# Patient Record
Sex: FEMALE | Employment: UNEMPLOYED | URBAN - METROPOLITAN AREA
[De-identification: names, ages, dates, MRNs, and addresses within clinical notes are randomized per-mention and may not be internally consistent; named-entity substitution may affect disease eponyms.]

---

## 2019-04-23 ENCOUNTER — OFFICE VISIT (OUTPATIENT)
Dept: FAMILY MEDICINE CLINIC | Age: 29
End: 2019-04-23

## 2019-04-23 VITALS
BODY MASS INDEX: 25.52 KG/M2 | RESPIRATION RATE: 16 BRPM | SYSTOLIC BLOOD PRESSURE: 120 MMHG | DIASTOLIC BLOOD PRESSURE: 84 MMHG | WEIGHT: 144 LBS | HEIGHT: 63 IN | HEART RATE: 117 BPM | TEMPERATURE: 97.9 F | OXYGEN SATURATION: 99 %

## 2019-04-23 DIAGNOSIS — E53.8 VITAMIN B12 DEFICIENCY: ICD-10-CM

## 2019-04-23 DIAGNOSIS — B35.8 TINEA FACIALE: ICD-10-CM

## 2019-04-23 DIAGNOSIS — J34.2 NASAL SEPTAL DEVIATION: ICD-10-CM

## 2019-04-23 DIAGNOSIS — E55.9 VITAMIN D DEFICIENCY: ICD-10-CM

## 2019-04-23 DIAGNOSIS — Z00.00 ROUTINE GENERAL MEDICAL EXAMINATION AT A HEALTH CARE FACILITY: Primary | ICD-10-CM

## 2019-04-23 DIAGNOSIS — E66.3 OVER WEIGHT: ICD-10-CM

## 2019-04-23 DIAGNOSIS — R07.89 CHEST TIGHTNESS: ICD-10-CM

## 2019-04-23 DIAGNOSIS — R00.2 PALPITATIONS: ICD-10-CM

## 2019-04-23 RX ORDER — FLUTICASONE PROPIONATE 50 MCG
2 SPRAY, SUSPENSION (ML) NASAL
Qty: 1 BOTTLE | Refills: 1 | Status: SHIPPED | OUTPATIENT
Start: 2019-04-23 | End: 2019-11-05 | Stop reason: ALTCHOICE

## 2019-04-23 RX ORDER — CLOTRIMAZOLE AND BETAMETHASONE DIPROPIONATE 10; .64 MG/G; MG/G
CREAM TOPICAL
Refills: 0 | COMMUNITY
Start: 2019-01-30 | End: 2020-12-15 | Stop reason: ALTCHOICE

## 2019-04-23 RX ORDER — PRENATAL VIT 91/IRON/FOLIC/DHA 28-975-200
COMBINATION PACKAGE (EA) ORAL 2 TIMES DAILY
Qty: 30 G | Refills: 1 | Status: SHIPPED | OUTPATIENT
Start: 2019-04-23 | End: 2019-11-05 | Stop reason: ALTCHOICE

## 2019-04-23 NOTE — PATIENT INSTRUCTIONS
Ringworm: Care Instructions Your Care Instructions Ringworm is a fungus infection of the skin. It is not caused by a worm. Ringworm causes a round, scaly rash that may crack and itch. The rash can spread over a wide area. One type of fungus that causes ringworm is often found in locker rooms and swimming pools. It grows well in warm, moist areas of the skin, such as in skin folds. You can get ringworm by sharing towels, clothing, and sports equipment. You can also get it by touching someone who has ringworm. Ringworm is treated with cream that kills the fungus. If the rash is widespread, you may need pills to get rid of it. Ringworm often comes back after treatment. If the rash becomes infected with bacteria, you may need antibiotics. Follow-up care is a key part of your treatment and safety. Be sure to make and go to all appointments, and call your doctor if you are having problems. It's also a good idea to know your test results and keep a list of the medicines you take. How can you care for yourself at home? · Take your medicines exactly as prescribed. Call your doctor if you have any problems with your medicine. · Wash the rash with soap and water, remove flaky skin, and dry thoroughly. · Try an over-the-counter cream with clotrimazole or miconazole in it. Brand names include Lotrimin, Micatin, and Tinactin. Terbinafine cream (Lamisil) is also available without a prescription. Spread the cream beyond the edge or border of the rash. Follow the directions on the package. Do not stop using the medicine just because your skin clears up. You will probably need to continue treatment for 2 to 4 weeks. · To keep from getting another infection: ? Do not go barefoot in public places such as gyms or locker rooms. Avoid sharing towels and clothes. Use flip-flops or some other type of shoe in the shower.  
? Do not wear tight clothes or let your skin stay damp for long periods, such as by staying in a wet bathing suit or sweaty clothes. When should you call for help? Call your doctor now or seek immediate medical care if: 
  · You have signs of infection such as: 
? Pain, warmth, or swelling in your skin. ? Red streaks near a wound in the skin. ? Pus coming from the rash on your skin. ? A fever.  
 Watch closely for changes in your health, and be sure to contact your doctor if: 
  · Your ringworm does not improve after 2 weeks of treatment.  
  · You do not get better as expected. Where can you learn more? Go to http://tasha-dee.info/. Enter H516 in the search box to learn more about \"Ringworm: Care Instructions. \" Current as of: April 17, 2018 Content Version: 11.9 © 2930-6724 [employer]. Care instructions adapted under license by [employer] (which disclaims liability or warranty for this information). If you have questions about a medical condition or this instruction, always ask your healthcare professional. [place] [address] any warranty or liability for your use of this information.

## 2019-04-23 NOTE — PROGRESS NOTES
HISTORY OF PRESENT ILLNESS Sadia Jewell is a 25 y.o. female. She is a new patient and is here to establish care. Previous followed by PCP in [place]. The following sections were reviewed & updated as appropriate: PMH, PL, PSH, and SH. Seen for physical and several concerns including palpitations, chest tightness, breathing discomfort, skin lesions on face H/o vitamin D deficiency, Vitamin b12 deficiency in [place]. HPI Health Maintenance Immunizations:  
 Influenza: not UTD - she declines. Tetanus: unknown, record requested. Gardasil: n/a. Cancer screening:  
 Cervical: reviewed guidelines, UTD, done by OBGYN, records requested. Breast: reviewed guidelines, reviewed SBE with her, UTD Palpitations Patient complains of palpitations and shortness of breath. The symptoms are of moderate severity, occuring intermittent and lasting a few minutes per episode. Cardiac risk factors include: none. Aggravating factors: exercise, stress/anxiety. Relieving factors: none. Associated signs and symptoms: has no complaint(s) of chest pain, irregular heart beats, near-syncope, syncope, fatigue, orthopnea, paroxysmal nocturnal dyspnea, claudication, lower extremity edema, tachypnea or has complaint(s) of chest pressure/discomfort, dyspnea on exertion. Had EKG in [facility] and was informed everything was ok. Symptoms are worst with walking, or talking fast or any stress. Patient Care Team: 
Rikki Edwards MD as PCP - [facility]) Christiano Conley MD (Obstetrics & Gynecology) The following sections were reviewed & updated as appropriate: PMH, PSH, FH, and SH. Patient Active Problem List  
Diagnosis Code  Vitamin B12 deficiency E53.8  Vitamin D deficiency E55.9  Over weight E66.3 Prior to Admission medications Medication Sig Start Date End Date Taking?  Authorizing Provider  
fluticasone propionate (FLONASE) 50 mcg/actuation nasal spray 2 Sprays by Both Nostrils route nightly. 4/23/19  Yes Cecille Berry MD  
terbinafine HCl (LAMISIL) 1 % topical cream Apply  to affected area two (2) times a day. 4/23/19  Yes Cecille Berry MD  
clotrimazole-betamethasone (LOTRISONE) topical cream APPLY TO AFFECTED AREA TWICE A DAY FOR 10 DAYS 1/30/19   Provider, Historical  
  
 
 
No Known Allergies Review of Systems Constitutional: Negative for chills, fever and malaise/fatigue. HENT: Negative for congestion, ear pain, sore throat and tinnitus. Eyes: Negative for blurred vision, double vision, pain and discharge. Respiratory: Positive for shortness of breath. Negative for cough and wheezing. Cardiovascular: Positive for palpitations. Negative for chest pain and leg swelling. Gastrointestinal: Negative for abdominal pain, blood in stool, constipation, diarrhea, nausea and vomiting. Genitourinary: Negative for dysuria, frequency, hematuria and urgency. Musculoskeletal: Negative for back pain, joint pain and myalgias. Skin: Positive for rash (on face). Neurological: Negative for dizziness, tremors, seizures and headaches. Endo/Heme/Allergies: Negative for polydipsia. Does not bruise/bleed easily. Psychiatric/Behavioral: Negative for depression and substance abuse. The patient is not nervous/anxious. Physical Exam  
Constitutional: She is oriented to person, place, and time. She appears well-developed and well-nourished. HENT:  
Head: Normocephalic and atraumatic. Right Ear: External ear normal.  
Left Ear: External ear normal.  
Nose: Septal deviation present. Mouth/Throat: Oropharynx is clear and moist.  
Eyes: Pupils are equal, round, and reactive to light. Conjunctivae and EOM are normal. No scleral icterus. Neck: Normal range of motion. Neck supple. No JVD present. No thyromegaly present.   
Cardiovascular: Regular rhythm, normal heart sounds and intact distal pulses. Tachycardia present. Exam reveals no gallop and no friction rub. No murmur heard. Pulmonary/Chest: Effort normal and breath sounds normal. She has no wheezes. She has no rales. She exhibits no tenderness. Right breast exhibits no inverted nipple, no mass, no nipple discharge, no skin change and no tenderness. Left breast exhibits no inverted nipple, no mass, no nipple discharge, no skin change and no tenderness. Breasts are symmetrical.  
Abdominal: Soft. Bowel sounds are normal. She exhibits no distension and no mass. There is no tenderness. Musculoskeletal: Normal range of motion. She exhibits no edema or tenderness. Lymphadenopathy:  
  She has no cervical adenopathy. Neurological: She is alert and oriented to person, place, and time. She has normal reflexes. No cranial nerve deficit. Skin: Skin is warm and dry. No rash noted. Hypopigmented patches on both side cheeks Psychiatric: She has a normal mood and affect. Her behavior is normal. Judgment and thought content normal.  
Nursing note and vitals reviewed. ASSESSMENT and PLAN Diagnoses and all orders for this visit: 1. Routine general medical examination at a health care facility 
-     CBC WITH AUTOMATED DIFF 
-     METABOLIC PANEL, COMPREHENSIVE 
-     TSH REFLEX TO T4 
-     HEMOGLOBIN A1C WITH EAG 
-     URINALYSIS W/ RFLX MICROSCOPIC 
-     LIPID PANEL 2. Palpitations -     AMB POC EKG ROUTINE W/ 12 LEADS, INTER & REP 3. Chest tightness -     AMB POC EKG ROUTINE W/ 12 LEADS, INTER & REP 4. Vitamin D deficiency 
-     VITAMIN D, 25 HYDROXY 5. Nasal septal deviation 
-     fluticasone propionate (FLONASE) 50 mcg/actuation nasal spray; 2 Sprays by Both Nostrils route nightly. 6. Vitamin B12 deficiency 
-     VITAMIN B12 
 
7. Tinea faciale -     terbinafine HCl (LAMISIL) 1 % topical cream; Apply  to affected area two (2) times a day. 8. Over weight Assessment & Plan: Discussed abnormal weight, ideal BMI and importance of diet and exercise to loose weight. Referral for exercise program was offered. Printed information about diet and lifestyle modification provided. EKG normal except tachycardia. Discussed Holter monitor and also further cardiac testing . But irst will do baseline work up to r/o thyroid disease or anemia Discussed lifestyle issues and health guidance given Patient was given an after visit summary which includes diagnoses, vital signs, current medications, instructions and references & authorized prescriptions . Results of labs will be conveyed to patient, once available. Pt verbalized instructions I provided and expressed understanding of discussion that was held today.

## 2019-04-26 DIAGNOSIS — E55.9 VITAMIN D DEFICIENCY: Primary | ICD-10-CM

## 2019-04-26 DIAGNOSIS — D50.8 IRON DEFICIENCY ANEMIA SECONDARY TO INADEQUATE DIETARY IRON INTAKE: ICD-10-CM

## 2019-04-26 LAB
25(OH)D3+25(OH)D2 SERPL-MCNC: 10.1 NG/ML (ref 30–100)
ALBUMIN SERPL-MCNC: 4.2 G/DL (ref 3.5–5.5)
ALBUMIN/GLOB SERPL: 1.4 {RATIO} (ref 1.2–2.2)
ALP SERPL-CCNC: 73 IU/L (ref 39–117)
ALT SERPL-CCNC: 33 IU/L (ref 0–32)
APPEARANCE UR: ABNORMAL
AST SERPL-CCNC: 26 IU/L (ref 0–40)
BACTERIA #/AREA URNS HPF: ABNORMAL /[HPF]
BASOPHILS # BLD AUTO: 0 X10E3/UL (ref 0–0.2)
BASOPHILS NFR BLD AUTO: 0 %
BILIRUB SERPL-MCNC: <0.2 MG/DL (ref 0–1.2)
BILIRUB UR QL STRIP: NEGATIVE
BUN SERPL-MCNC: 8 MG/DL (ref 6–20)
BUN/CREAT SERPL: 15 (ref 9–23)
CALCIUM SERPL-MCNC: 9.1 MG/DL (ref 8.7–10.2)
CASTS URNS QL MICRO: ABNORMAL /LPF
CHLORIDE SERPL-SCNC: 104 MMOL/L (ref 96–106)
CHOLEST SERPL-MCNC: 160 MG/DL (ref 100–199)
CO2 SERPL-SCNC: 22 MMOL/L (ref 20–29)
COLOR UR: YELLOW
CREAT SERPL-MCNC: 0.55 MG/DL (ref 0.57–1)
EOSINOPHIL # BLD AUTO: 0.1 X10E3/UL (ref 0–0.4)
EOSINOPHIL NFR BLD AUTO: 1 %
EPI CELLS #/AREA URNS HPF: >10 /HPF (ref 0–10)
ERYTHROCYTE [DISTWIDTH] IN BLOOD BY AUTOMATED COUNT: 17 % (ref 12.3–15.4)
EST. AVERAGE GLUCOSE BLD GHB EST-MCNC: 114 MG/DL
GLOBULIN SER CALC-MCNC: 2.9 G/DL (ref 1.5–4.5)
GLUCOSE SERPL-MCNC: 82 MG/DL (ref 65–99)
GLUCOSE UR QL: NEGATIVE
HBA1C MFR BLD: 5.6 % (ref 4.8–5.6)
HCT VFR BLD AUTO: 34 % (ref 34–46.6)
HDLC SERPL-MCNC: 46 MG/DL
HGB BLD-MCNC: 10.6 G/DL (ref 11.1–15.9)
HGB UR QL STRIP: NEGATIVE
IMM GRANULOCYTES # BLD AUTO: 0 X10E3/UL (ref 0–0.1)
IMM GRANULOCYTES NFR BLD AUTO: 0 %
INTERPRETATION, 910389: NORMAL
KETONES UR QL STRIP: NEGATIVE
LDLC SERPL CALC-MCNC: 95 MG/DL (ref 0–99)
LEUKOCYTE ESTERASE UR QL STRIP: ABNORMAL
LYMPHOCYTES # BLD AUTO: 1.9 X10E3/UL (ref 0.7–3.1)
LYMPHOCYTES NFR BLD AUTO: 35 %
MCH RBC QN AUTO: 22.3 PG (ref 26.6–33)
MCHC RBC AUTO-ENTMCNC: 31.2 G/DL (ref 31.5–35.7)
MCV RBC AUTO: 71 FL (ref 79–97)
MICRO URNS: ABNORMAL
MONOCYTES # BLD AUTO: 0.5 X10E3/UL (ref 0.1–0.9)
MONOCYTES NFR BLD AUTO: 10 %
MUCOUS THREADS URNS QL MICRO: PRESENT
NEUTROPHILS # BLD AUTO: 2.9 X10E3/UL (ref 1.4–7)
NEUTROPHILS NFR BLD AUTO: 54 %
NITRITE UR QL STRIP: NEGATIVE
PH UR STRIP: 5.5 [PH] (ref 5–7.5)
PLATELET # BLD AUTO: 303 X10E3/UL (ref 150–379)
POTASSIUM SERPL-SCNC: 4.3 MMOL/L (ref 3.5–5.2)
PROT SERPL-MCNC: 7.1 G/DL (ref 6–8.5)
PROT UR QL STRIP: NEGATIVE
RBC # BLD AUTO: 4.76 X10E6/UL (ref 3.77–5.28)
RBC #/AREA URNS HPF: ABNORMAL /HPF (ref 0–2)
SODIUM SERPL-SCNC: 140 MMOL/L (ref 134–144)
SP GR UR: 1.01 (ref 1–1.03)
TRIGL SERPL-MCNC: 96 MG/DL (ref 0–149)
TSH SERPL DL<=0.005 MIU/L-ACNC: 3.81 UIU/ML (ref 0.45–4.5)
UROBILINOGEN UR STRIP-MCNC: 0.2 MG/DL (ref 0.2–1)
VIT B12 SERPL-MCNC: 247 PG/ML (ref 232–1245)
VLDLC SERPL CALC-MCNC: 19 MG/DL (ref 5–40)
WBC # BLD AUTO: 5.4 X10E3/UL (ref 3.4–10.8)
WBC #/AREA URNS HPF: ABNORMAL /HPF (ref 0–5)
YEAST #/AREA URNS HPF: PRESENT /[HPF]

## 2019-04-26 RX ORDER — LANOLIN ALCOHOL/MO/W.PET/CERES
325 CREAM (GRAM) TOPICAL
Qty: 90 TAB | Refills: 1 | Status: SHIPPED | OUTPATIENT
Start: 2019-04-26 | End: 2019-11-06 | Stop reason: SDUPTHER

## 2019-04-26 RX ORDER — ERGOCALCIFEROL 1.25 MG/1
50000 CAPSULE ORAL
Qty: 4 CAP | Refills: 5 | Status: SHIPPED | OUTPATIENT
Start: 2019-04-26

## 2019-04-26 RX ORDER — FLUCONAZOLE 150 MG/1
150 TABLET ORAL ONCE
Qty: 1 TAB | Refills: 0 | Status: SHIPPED | OUTPATIENT
Start: 2019-04-26 | End: 2019-04-26

## 2019-04-26 NOTE — PROGRESS NOTES
Call placed to patient. Spoke with  who is on PHI. Provided  with patients lab results and provider recommendations. Advised that medications were sent to the pharmacy and to take OTC B 12. He requested for urine results to be sent to Dr. Leanne Ruiz.

## 2019-04-26 NOTE — PROGRESS NOTES
Please inform patient, 
CBC shows low hemoglobin, will need to be on iron supplement Kidney,liver,thyroid,cholesterol,average sugar ( diabetes screen) results are normal 
Vitamin D very low, need to be on high dose Rx, will send Rx to pharmacy Urine normal except positive for yeast, will send Fluconazole Vitamin B12 low normal, to start OTC vitamin B12 1000 mcg daily Follow up in 3-4 months to recheck lab 
thanks

## 2019-11-05 ENCOUNTER — OFFICE VISIT (OUTPATIENT)
Dept: FAMILY MEDICINE CLINIC | Age: 29
End: 2019-11-05

## 2019-11-05 VITALS
HEART RATE: 97 BPM | DIASTOLIC BLOOD PRESSURE: 65 MMHG | TEMPERATURE: 98.5 F | RESPIRATION RATE: 18 BRPM | SYSTOLIC BLOOD PRESSURE: 102 MMHG | OXYGEN SATURATION: 95 % | BODY MASS INDEX: 26.05 KG/M2 | HEIGHT: 63 IN | WEIGHT: 147 LBS

## 2019-11-05 DIAGNOSIS — R00.2 PALPITATIONS: Primary | ICD-10-CM

## 2019-11-05 DIAGNOSIS — E55.9 VITAMIN D DEFICIENCY: ICD-10-CM

## 2019-11-05 DIAGNOSIS — D50.9 HYPOCHROMIC ANEMIA: ICD-10-CM

## 2019-11-05 DIAGNOSIS — R07.89 CHEST TIGHTNESS: ICD-10-CM

## 2019-11-05 DIAGNOSIS — E53.8 VITAMIN B12 DEFICIENCY: ICD-10-CM

## 2019-11-05 RX ORDER — IBUPROFEN 800 MG/1
800 TABLET ORAL
COMMUNITY
Start: 2019-10-05 | End: 2020-10-04

## 2019-11-05 NOTE — PROGRESS NOTES
HISTORY OF PRESENT ILLNESS  Jonathan More is a 30 y.o. female. seen today for palpitaions associated with chest tightness and SOB. Also for follow up on her low vitamin D,b12 and anemia. HPI   Palpitations  Patient complains of palpitations and shortness of breath. The symptoms are of moderate severity, occuring intermittent and lasting a few minutes per episode. Cardiac risk factors include: none. Aggravating factors: exercise, stress/anxiety. Relieving factors: rest. Associated signs and symptoms: has no complaint(s) of chest pain, near-syncope, syncope, orthopnea, paroxysmal nocturnal dyspnea, exertional chest pressure/discomfort or has complaint(s) of chest pressure/discomfort, dyspnea, palpitations, irregular heart beats, fatigue, tachypnea, dyspnea on exertion. She has brought her apple watch readings showing heart rate ranging from  for one day. On her last visit she had EKG done,which showed tachycardia,otherwise it was normal    Blood work on last visit, showing anemia, low vitamin b12 and very low vitamin d .she was started on iron supplement, but has not taken it. Lab Results   Component Value Date/Time    WBC 5.4 04/25/2019 08:38 AM    HGB 10.6 (L) 04/25/2019 08:38 AM    HCT 34.0 04/25/2019 08:38 AM    PLATELET 430 36/69/1136 08:38 AM    MCV 71 (L) 04/25/2019 08:38 AM     Lab Results   Component Value Date/Time    VITAMIN D, 25-HYDROXY 10.1 (L) 04/25/2019 08:38 AM         Review of Systems   Constitutional: Positive for malaise/fatigue. Negative for chills and fever. HENT: Negative for congestion, ear pain, sore throat and tinnitus. Eyes: Negative for blurred vision, double vision, pain and discharge. Respiratory: Positive for shortness of breath. Negative for cough and wheezing. Cardiovascular: Positive for chest pain and palpitations. Negative for leg swelling. Gastrointestinal: Negative for abdominal pain, blood in stool, constipation, diarrhea, nausea and vomiting. Genitourinary: Negative for dysuria, frequency, hematuria and urgency. Musculoskeletal: Negative for back pain, joint pain and myalgias. Skin: Negative for rash. Neurological: Negative for dizziness, tremors, seizures and headaches. Endo/Heme/Allergies: Negative for polydipsia. Does not bruise/bleed easily. Psychiatric/Behavioral: Negative for depression and substance abuse. The patient is not nervous/anxious. Physical Exam   Constitutional: She is oriented to person, place, and time. She appears well-developed and well-nourished. HENT:   Head: Normocephalic and atraumatic. Right Ear: External ear normal.   Mouth/Throat: Oropharynx is clear and moist. No oropharyngeal exudate. Eyes: Pupils are equal, round, and reactive to light. Conjunctivae and EOM are normal. No scleral icterus. Neck: Normal range of motion. Neck supple. No JVD present. No thyromegaly present. Cardiovascular: Regular rhythm, normal heart sounds and intact distal pulses. Tachycardia present. No murmur heard. Pulses:       Carotid pulses are 2+ on the right side, and 2+ on the left side. Radial pulses are 2+ on the right side, and 2+ on the left side. Femoral pulses are 2+ on the right side, and 2+ on the left side. Popliteal pulses are 2+ on the right side, and 2+ on the left side. Dorsalis pedis pulses are 2+ on the right side, and 2+ on the left side. Posterior tibial pulses are 2+ on the right side, and 2+ on the left side. Pulmonary/Chest: Effort normal and breath sounds normal. She has no wheezes. Abdominal: Soft. Bowel sounds are normal. She exhibits no distension and no mass. Musculoskeletal: Normal range of motion. She exhibits no edema or tenderness. Lymphadenopathy:     She has no cervical adenopathy. Neurological: She is alert and oriented to person, place, and time. She has normal reflexes. No cranial nerve deficit. Skin: Skin is warm and dry. No rash noted. She is not diaphoretic. Psychiatric: She has a normal mood and affect. Nursing note and vitals reviewed. ASSESSMENT and PLAN  Diagnoses and all orders for this visit:    1. Palpitations  -     REFERRAL TO CARDIOLOGY    2. Chest tightness  -     REFERRAL TO CARDIOLOGY    3. Vitamin D deficiency  -     VITAMIN D, 25 HYDROXY    4. Vitamin B12 deficiency  -     VITAMIN B12    5. Hypochromic anemia  -     IRON PROFILE    will check echo and holter as patient is symptomatic. Referred to cardiology for further evaluation. Likely from her chronic anemia and low O2 carrying capacity . Will check lab and follow up accordingly  Discussed lifestyle issues and health guidance given  Patient was given an after visit summary which includes diagnoses, vital signs, current medications, instructions and references & authorized prescriptions . Results of labs will be conveyed to patient, once available. Pt verbalized instructions I provided and expressed understanding of discussion that was held today. Follow-up and Dispositions    · Return if symptoms worsen or fail to improve.

## 2019-11-05 NOTE — PATIENT INSTRUCTIONS
Palpitations: Care Instructions  Your Care Instructions    Heart palpitations are the uncomfortable sensation that your heart is beating fast or irregularly. You might feel pounding or fluttering in your chest. It might feel like your heart is skipping a beat. Although palpitations may be caused by a heart problem, they also occur because of stress, fatigue, or use of alcohol, caffeine, or nicotine. Many medicines, including diet pills, antihistamines, decongestants, and some herbal products, can cause heart palpitations. Nearly everyone has palpitations from time to time. Depending on your symptoms, your doctor may need to do more tests to try to find the cause of your palpitations. Follow-up care is a key part of your treatment and safety. Be sure to make and go to all appointments, and call your doctor if you are having problems. It's also a good idea to know your test results and keep a list of the medicines you take. How can you care for yourself at home? · Avoid caffeine, nicotine, and excess alcohol. · Do not take illegal drugs, such as methamphetamines and cocaine. · Do not take weight loss or diet medicines unless you talk with your doctor first.  · Get plenty of sleep. · Do not overeat. · If you have palpitations again, take deep breaths and try to relax. This may slow a racing heart. · If you start to feel lightheaded, lie down to avoid injuries that might result if you pass out and fall down. · Keep a record of your palpitations and bring it to your next doctor's appointment. Write down:  ? The date and time. ? Your pulse. (If your heart is beating fast, it may be hard to count your pulse.)  ? What you were doing when the palpitations started. ? How long the palpitations lasted. ? Any other symptoms. · If an activity causes palpitations, slow down or stop. Talk to your doctor before you do that activity again. · Take your medicines exactly as prescribed.  Call your doctor if you think you are having a problem with your medicine. When should you call for help? Call 911 anytime you think you may need emergency care. For example, call if:    · You passed out (lost consciousness).     · You have symptoms of a heart attack. These may include:  ? Chest pain or pressure, or a strange feeling in the chest.  ? Sweating. ? Shortness of breath. ? Pain, pressure, or a strange feeling in the back, neck, jaw, or upper belly or in one or both shoulders or arms. ? Lightheadedness or sudden weakness. ? A fast or irregular heartbeat. After you call 911, the  may tell you to chew 1 adult-strength or 2 to 4 low-dose aspirin. Wait for an ambulance. Do not try to drive yourself.     · You have symptoms of a stroke. These may include:  ? Sudden numbness, tingling, weakness, or loss of movement in your face, arm, or leg, especially on only one side of your body. ? Sudden vision changes. ? Sudden trouble speaking. ? Sudden confusion or trouble understanding simple statements. ? Sudden problems with walking or balance. ? A sudden, severe headache that is different from past headaches.    Call your doctor now or seek immediate medical care if:    · You have heart palpitations and:  ? Are dizzy or lightheaded, or you feel like you may faint. ? Have new or increased shortness of breath.    Watch closely for changes in your health, and be sure to contact your doctor if:    · You continue to have heart palpitations. Where can you learn more? Go to http://tasha-dee.info/. Enter R508 in the search box to learn more about \"Palpitations: Care Instructions. \"  Current as of: April 9, 2019  Content Version: 12.2  © 5533-9041 [employer]. Care instructions adapted under license by [employer] (which disclaims liability or warranty for this information).  If you have questions about a medical condition or this instruction, always ask your healthcare professional. [place], [facility] disclaims any warranty or liability for your use of this information. Iron-Rich Diet: Care Instructions  Your Care Instructions    Your body needs iron to make hemoglobin. Hemoglobin is a substance in red blood cells that carries oxygen from the lungs to cells all through your body. If you do not get enough iron, your body makes fewer and smaller red blood cells. As a result, your body's cells may not get enough oxygen. Adult men need 8 milligrams of iron a day; adult women need 18 milligrams of iron a day. After menopause, women need 8 milligrams of iron a day. A pregnant woman needs 27 milligrams of iron a day. Infants and young children have higher iron needs relative to their size than other age groups. People who have lost blood because of ulcers or heavy menstrual periods may become very low in iron and may develop anemia. Most people can get the iron their bodies need by eating enough of certain iron-rich foods. Your doctor may recommend that you take an iron supplement along with eating an iron-rich diet. Follow-up care is a key part of your treatment and safety. Be sure to make and go to all appointments, and call your doctor if you are having problems. It's also a good idea to know your test results and keep a list of the medicines you take. How can you care for yourself at home? · Make iron-rich foods a part of your daily diet. Iron-rich foods include:  ? All meats, such as chicken, beef, lamb, pork, fish, and shellfish. Liver is especially high in iron. ? Leafy green vegetables. ? Raisins, peas, beans, lentils, barley, and eggs. ? Iron-fortified breakfast cereals. · Eat foods with vitamin C along with iron-rich foods. Vitamin C helps you absorb more iron from food. Drink a glass of orange juice or another citrus juice with your food. · Eat meat and vegetables or grains together. The iron in meat helps your body absorb the iron in other foods.   Where can you learn more? Go to http://tasha-dee.info/. Enter 0328 [id number] in the search box to learn more about \"Iron-Rich Diet: Care Instructions. \"  Current as of: November 7, 2018  Content Version: 12.2  © 4158-9200 [employer]. Care instructions adapted under license by [employer] (which disclaims liability or warranty for this information). If you have questions about a medical condition or this instruction, always ask your healthcare professional. [address] any warranty or liability for your use of this information.

## 2019-11-05 NOTE — PROGRESS NOTES
Chief Complaint   Patient presents with    Palpitations     Patient is in the office today with complaints of irregular heart beat. 1. Have you been to the ER, urgent care clinic since your last visit? Hospitalized since your last visit? No    2. Have you seen or consulted any other health care providers outside of the [address] [place] [address] since your last visit? Include any pap smears or colon screening.  No

## 2019-11-06 DIAGNOSIS — D50.8 IRON DEFICIENCY ANEMIA SECONDARY TO INADEQUATE DIETARY IRON INTAKE: ICD-10-CM

## 2019-11-06 LAB
25(OH)D3+25(OH)D2 SERPL-MCNC: 29.5 NG/ML (ref 30–100)
IRON SATN MFR SERPL: 5 % (ref 15–55)
IRON SERPL-MCNC: 23 UG/DL (ref 27–159)
TIBC SERPL-MCNC: 483 UG/DL (ref 250–450)
UIBC SERPL-MCNC: 460 UG/DL (ref 131–425)
VIT B12 SERPL-MCNC: 343 PG/ML (ref 232–1245)

## 2019-11-06 RX ORDER — LANOLIN ALCOHOL/MO/W.PET/CERES
325 CREAM (GRAM) TOPICAL
Qty: 90 TAB | Refills: 1 | Status: SHIPPED | OUTPATIENT
Start: 2019-11-06

## 2019-11-06 NOTE — PROGRESS NOTES
Yasmin Leija,  Your iron saturation is very low, c/w severe iron deficiency anemia. Please start on iron pill as discussed. I am sending Rx again and check with pharmacy  Vitamin d has improved ,so can stop high dose and change to OTC vitamin D3 2000 units daily  B12 level is ok. Also for constipation, start OTC Metamucil syrup and take only as needed.  You can take Isabgool powder ( available in [employer]) and mix 2 tea spoonful in a glass of water and can take it daily  Please schedule your appointment with cardiology as discussed  thanks

## 2020-11-20 ENCOUNTER — PATIENT MESSAGE (OUTPATIENT)
Dept: FAMILY MEDICINE CLINIC | Age: 30
End: 2020-11-20

## 2020-11-22 NOTE — TELEPHONE ENCOUNTER
From: Marylou Davison  To: Angela Navarro MD  Sent: 11/20/2020 4:07 PM EST  Subject: Non-Urgent Medical Question    Hi,    I am having constipation from last one week and from last couple of day I am unable to pass stool for entire day. Yesterday I had surgery and I had taken Loly Pepper lacks to avoid constipation but that didn't work out. I had also taken whole husk pysillium powder as well but that also didn't works. Can you please suggest some more alternative medicine for proper stool.     Thanks,   Raiza Cotto

## 2020-11-24 ENCOUNTER — VIRTUAL VISIT (OUTPATIENT)
Dept: FAMILY MEDICINE CLINIC | Age: 30
End: 2020-11-24
Payer: COMMERCIAL

## 2020-11-24 DIAGNOSIS — K60.0 ACUTE ANAL FISSURE: ICD-10-CM

## 2020-11-24 DIAGNOSIS — K59.02 CONSTIPATION DUE TO OUTLET DYSFUNCTION: Primary | ICD-10-CM

## 2020-11-24 DIAGNOSIS — K64.8 INTERNAL HEMORRHOID: ICD-10-CM

## 2020-11-24 PROCEDURE — 99213 OFFICE O/P EST LOW 20 MIN: CPT | Performed by: FAMILY MEDICINE

## 2020-11-24 RX ORDER — HYDROCORTISONE 25 MG/G
CREAM TOPICAL 2 TIMES DAILY
Qty: 30 G | Refills: 0 | Status: SHIPPED | OUTPATIENT
Start: 2020-11-24

## 2020-11-24 RX ORDER — DOCUSATE SODIUM 100 MG/1
100 CAPSULE, LIQUID FILLED ORAL 2 TIMES DAILY
Qty: 60 CAP | Refills: 0 | Status: SHIPPED | OUTPATIENT
Start: 2020-11-24 | End: 2020-12-24

## 2020-11-24 RX ORDER — LEVOTHYROXINE SODIUM 50 UG/1
TABLET ORAL
COMMUNITY
Start: 2020-11-13

## 2020-11-24 NOTE — PROGRESS NOTES
Tenisha Joseph is a 26 y.o. female who was seen by synchronous (real-time) audio-video technology on 11/24/2020 for Constipation        Assessment & Plan:   Diagnoses and all orders for this visit:    1. Constipation due to outlet dysfunction  -     docusate sodium (COLACE) 100 mg capsule; Take 1 Cap by mouth two (2) times a day for 30 days. 2. Acute anal fissure  -     hydrocortisone (ANUSOL-HC) 2.5 % rectal cream; Insert  into rectum two (2) times a day. 3. Internal hemorrhoid  -     hydrocortisone (ANUSOL-HC) 2.5 % rectal cream; Insert  into rectum two (2) times a day. I spent at least 15 minutes on this visit with this established patient. Recommended to drink plenty fluids and to continue with high fiber diet   Discussed possibility of fissure vs internal hemorrhoid  Follow up as needed  Subjective:   Patient is having constipation x 2 weeks  She is following Dr Arpita Avila for her infertility treatment and had laparoscopy for uterine fibroids, about a week ago. As per patient, surgery was not successful and this was her 3rd surgery for same, and it was again not successful. She had mild constipation before surgery, but since surgery she is having extreme constipation, and significant pain on BM. She is avoiding her BM due to pain. She took OTC Milk of Magnesia and Miralax randomly, without success. She had thyroid checked recently and her levels were normal    Prior to Admission medications    Medication Sig Start Date End Date Taking? Authorizing Provider   levothyroxine (SYNTHROID) 50 mcg tablet TAKE 1 TABLET BY MOUTH EVERY DAY IN THE MORNING ON EMPTY STOMACH 11/13/20  Yes Provider, Historical   ferrous sulfate 325 mg (65 mg iron) tablet Take 1 Tab by mouth Daily (before breakfast). 11/6/19  Yes Stephani Lim MD   ergocalciferol (ERGOCALCIFEROL) 50,000 unit capsule Take 1 Cap by mouth every seven (7) days.  4/26/19  Yes Stephani Lim MD   clotrimazole-betamethasone (LOTRISONE) topical cream APPLY TO AFFECTED AREA TWICE A DAY FOR 10 DAYS 1/30/19   Provider, Historical     Patient Active Problem List    Diagnosis Date Noted    Vitamin B12 deficiency 04/23/2019    Vitamin D deficiency 04/23/2019    Over weight 04/23/2019     Current Outpatient Medications   Medication Sig Dispense Refill    levothyroxine (SYNTHROID) 50 mcg tablet TAKE 1 TABLET BY MOUTH EVERY DAY IN THE MORNING ON EMPTY STOMACH      docusate sodium (COLACE) 100 mg capsule Take 1 Cap by mouth two (2) times a day for 30 days. 60 Cap 0    hydrocortisone (ANUSOL-HC) 2.5 % rectal cream Insert  into rectum two (2) times a day. 30 g 0    ferrous sulfate 325 mg (65 mg iron) tablet Take 1 Tab by mouth Daily (before breakfast). 90 Tab 1    ergocalciferol (ERGOCALCIFEROL) 50,000 unit capsule Take 1 Cap by mouth every seven (7) days. 4 Cap 5    clotrimazole-betamethasone (LOTRISONE) topical cream APPLY TO AFFECTED AREA TWICE A DAY FOR 10 DAYS  0     No Known Allergies  History reviewed. No pertinent past medical history. History reviewed. No pertinent surgical history. Family History   Problem Relation Age of Onset    Liver Disease Father         liver cirrhosis     Social History     Tobacco Use    Smoking status: Never Smoker    Smokeless tobacco: Never Used   Substance Use Topics    Alcohol use: Never     Frequency: Never       Review of Systems   Constitutional: Negative for chills, fever and malaise/fatigue. HENT: Negative for congestion, ear pain, sore throat and tinnitus. Eyes: Negative for blurred vision, double vision, pain and discharge. Respiratory: Negative for cough, shortness of breath and wheezing. Cardiovascular: Negative for chest pain, palpitations and leg swelling. Gastrointestinal: Positive for constipation. Negative for abdominal pain, blood in stool, diarrhea, nausea and vomiting. Pain with BM   Genitourinary: Negative for dysuria, frequency, hematuria and urgency.    Musculoskeletal: Negative for back pain, joint pain and myalgias. Skin: Negative for rash. Neurological: Negative for dizziness, tremors, seizures and headaches. Endo/Heme/Allergies: Negative for polydipsia. Does not bruise/bleed easily. Psychiatric/Behavioral: Negative for depression and substance abuse. The patient is not nervous/anxious. Objective:   No flowsheet data found. [INSTRUCTIONS:  \"[x]\" Indicates a positive item  \"[]\" Indicates a negative item  -- DELETE ALL ITEMS NOT EXAMINED]    Constitutional: [x] Appears well-developed and well-nourished [x] No apparent distress      [] Abnormal -     Mental status: [x] Alert and awake  [x] Oriented to person/place/time [x] Able to follow commands    [] Abnormal -     Eyes:   EOM    [x]  Normal    [] Abnormal -   Sclera  [x]  Normal    [] Abnormal -          Discharge [x]  None visible   [] Abnormal -     HENT: [x] Normocephalic, atraumatic  [] Abnormal -   [x] Mouth/Throat: Mucous membranes are moist    External Ears [x] Normal  [] Abnormal -    Neck: [x] No visualized mass [] Abnormal -     Pulmonary/Chest: [x] Respiratory effort normal   [x] No visualized signs of difficulty breathing or respiratory distress        [] Abnormal -      Musculoskeletal:   [x] Normal gait with no signs of ataxia         [x] Normal range of motion of neck        [] Abnormal -     Neurological:        [x] No Facial Asymmetry (Cranial nerve 7 motor function) (limited exam due to video visit)          [x] No gaze palsy        [] Abnormal -          Skin:        [x] No significant exanthematous lesions or discoloration noted on facial skin         [] Abnormal -            Psychiatric:       [x] Normal Affect [] Abnormal -        [x] No Hallucinations    Other pertinent observable physical exam findings:-        We discussed the expected course, resolution and complications of the diagnosis(es) in detail.   Medication risks, benefits, costs, interactions, and alternatives were discussed as indicated. I advised her to contact the office if her condition worsens, changes or fails to improve as anticipated. She expressed understanding with the diagnosis(es) and plan. Kristi Taylor, who was evaluated through a patient-initiated, synchronous (real-time) audio-video encounter, and/or her healthcare decision maker, is aware that it is a billable service, with coverage as determined by her insurance carrier. She provided verbal consent to proceed: Yes, and patient identification was verified. It was conducted pursuant to the emergency declaration under the [address], [address] [place] [address] authority and the [employer] and [employer] General Act. A caregiver was present when appropriate. Ability to conduct physical exam was limited. I was in the office. The patient was at home.     This service was provided through telehealth, between patient Kristi Taylor participating from home and Bienvenido Walsh MD from [facility]     I discussed with the patient the nature of our telemedicine visits, that:      I would evaluate the patient and recommend diagnostics and treatments based on my assessment   Our sessions are not being recorded and that personal health information is protected   Our team would provide follow up care in person if/when the patient needs it    Cece Steiner MD

## 2020-11-24 NOTE — PATIENT INSTRUCTIONS
Constipation: Care Instructions Your Care Instructions Constipation means that you have a hard time passing stools (bowel movements). People pass stools from 3 times a day to once every 3 days. What is normal for you may be different. Constipation may occur with pain in the rectum and cramping. The pain may get worse when you try to pass stools. Sometimes there are small amounts of bright red blood on toilet paper or the surface of stools. This is because of enlarged veins near the rectum (hemorrhoids). A few changes in your diet and lifestyle may help you avoid ongoing constipation. Your doctor may also prescribe medicine to help loosen your stool. Some medicines can cause constipation. These include pain medicines and antidepressants. Tell your doctor about all the medicines you take. Your doctor may want to make a medicine change to ease your symptoms. Follow-up care is a key part of your treatment and safety. Be sure to make and go to all appointments, and call your doctor if you are having problems. It's also a good idea to know your test results and keep a list of the medicines you take. How can you care for yourself at home? · Drink plenty of fluids, enough so that your urine is light yellow or clear like water. If you have kidney, heart, or liver disease and have to limit fluids, talk with your doctor before you increase the amount of fluids you drink. · Include high-fiber foods in your diet each day. These include fruits, vegetables, beans, and whole grains. · Get at least 30 minutes of exercise on most days of the week. Walking is a good choice. You also may want to do other activities, such as running, swimming, cycling, or playing tennis or team sports. · Take a fiber supplement, such as Citrucel or Metamucil, every day. Read and follow all instructions on the label. · Schedule time each day for a bowel movement. A daily routine may help. Take your time having your bowel movement. · Support your feet with a small step stool when you sit on the toilet. This helps flex your hips and places your pelvis in a squatting position. · Your doctor may recommend an over-the-counter laxative to relieve your constipation. Examples are Milk of Magnesia and MiraLax. Read and follow all instructions on the label. Do not use laxatives on a long-term basis. When should you call for help? Call your doctor now or seek immediate medical care if: 
  · You have new or worse belly pain.  
  · You have new or worse nausea or vomiting.  
  · You have blood in your stools. Watch closely for changes in your health, and be sure to contact your doctor if: 
  · Your constipation is getting worse.  
  · You do not get better as expected. Where can you learn more? Go to http://www.gray.com/ Enter 21 569.843.3663 in the search box to learn more about \"Constipation: Care Instructions. \" Current as of: June 26, 2019               Content Version: 12.6 © 7700-3969 [employer]. Care instructions adapted under license by [employer] (which disclaims liability or warranty for this information). If you have questions about a medical condition or this instruction, always ask your healthcare professional. [address] any warranty or liability for your use of this information.

## 2020-12-15 ENCOUNTER — VIRTUAL VISIT (OUTPATIENT)
Dept: FAMILY MEDICINE CLINIC | Age: 30
End: 2020-12-15
Payer: COMMERCIAL

## 2020-12-15 DIAGNOSIS — J30.9 ALLERGIC SINUSITIS: Primary | ICD-10-CM

## 2020-12-15 DIAGNOSIS — R05.8 NOCTURNAL COUGH: ICD-10-CM

## 2020-12-15 DIAGNOSIS — F51.04 PSYCHOPHYSIOLOGICAL INSOMNIA: ICD-10-CM

## 2020-12-15 PROCEDURE — 99213 OFFICE O/P EST LOW 20 MIN: CPT | Performed by: FAMILY MEDICINE

## 2020-12-15 RX ORDER — NORETHINDRONE AND ETHINYL ESTRADIOL 1 MG-35MCG
KIT ORAL
COMMUNITY
Start: 2020-12-10

## 2020-12-15 RX ORDER — LANOLIN ALCOHOL/MO/W.PET/CERES
400 CREAM (GRAM) TOPICAL
Qty: 30 TAB | Refills: 1 | Status: SHIPPED | OUTPATIENT
Start: 2020-12-15

## 2020-12-15 RX ORDER — LORATADINE 10 MG/1
10 TABLET ORAL DAILY
Qty: 30 TAB | Refills: 0 | Status: SHIPPED | OUTPATIENT
Start: 2020-12-15

## 2020-12-15 RX ORDER — OXYCODONE AND ACETAMINOPHEN 5; 325 MG/1; MG/1
TABLET ORAL
COMMUNITY
Start: 2020-11-19 | End: 2020-12-15 | Stop reason: ALTCHOICE

## 2020-12-15 RX ORDER — ONDANSETRON 4 MG/1
TABLET, ORALLY DISINTEGRATING ORAL
COMMUNITY
Start: 2020-11-19 | End: 2020-12-15 | Stop reason: ALTCHOICE

## 2020-12-15 NOTE — PROGRESS NOTES
Yvon Casas is a 26 y.o. female who was seen by synchronous (real-time) audio-video technology on 12/15/2020 for Sleep Problem and Hair/Scalp Problem (hair loss )        Assessment & Plan:   Diagnoses and all orders for this visit:    1. Allergic sinusitis  -     loratadine (CLARITIN) 10 mg tablet; Take 1 Tab by mouth daily. 2. Nocturnal cough  -     loratadine (CLARITIN) 10 mg tablet; Take 1 Tab by mouth daily. 3. Psychophysiological insomnia  -     magnesium oxide (MAG-OX) 400 mg tablet; Take 1 Tab by mouth daily (after dinner). I spent at least 20 minutes on this visit with this established patient. Discussed sinusitis vs GERD as a reason of nocturnal cough. recommended steam inhalation. For insomnia, recommended Melatonin,meditation, magnesium    Subjective: Insomnia:  ONSET: several months ago  SEEN PREVIOUSLY: This is a new onset. DESCRIPTION OF SX:  patient estimates 4 hours of sleep per nite. difficulty initiating sleep.  frequent awakening  non-restorative sleep  tends to lay awake in bed. ASSOCIATED ISSUES: situational anxiety related to infertility and several procedures she had. also she is diagnosed with tachycardia  DENIES: depression and ETOH overuse  TREATMENT TO DATE: none  She has been to cardiology and was recommended to get ablation for her tachycardia      Cough  Patient complains of nasal congestion, nonproductive cough and sneezing. Symptoms began several days ago. The cough is non-productive, without wheezing, dyspnea or hemoptysis and is aggravated by reclining position Associated symptoms include:sinus congestion. Patient does not have new pets. Patient does not have a history of asthma. Patient does have a history of environmental allergens. Patient does not have recent travel. Patient does not have a history of smoking. Patient  had previous Chest X-ray. Patient does not have had a PPD done.       Prior to Admission medications    Medication Sig Start Date End Date Taking? Authorizing Provider   Alyacen 1/35, 28, 1-35 mg-mcg tab  12/10/20  Yes Provider, Historical   loratadine (CLARITIN) 10 mg tablet Take 1 Tab by mouth daily. 12/15/20  Yes Monika Brock MD   magnesium oxide (MAG-OX) 400 mg tablet Take 1 Tab by mouth daily (after dinner). 12/15/20  Yes Monika Brock MD   levothyroxine (SYNTHROID) 50 mcg tablet TAKE 1 TABLET BY MOUTH EVERY DAY IN THE MORNING ON EMPTY STOMACH 11/13/20  Yes Provider, Historical   ergocalciferol (ERGOCALCIFEROL) 50,000 unit capsule Take 1 Cap by mouth every seven (7) days. 4/26/19  Yes Monika Brock MD   docusate sodium (COLACE) 100 mg capsule Take 1 Cap by mouth two (2) times a day for 30 days. 11/24/20 12/24/20  Monika Brock MD   hydrocortisone (ANUSOL-HC) 2.5 % rectal cream Insert  into rectum two (2) times a day. 11/24/20   Monika Brock MD   ferrous sulfate 325 mg (65 mg iron) tablet Take 1 Tab by mouth Daily (before breakfast). 11/6/19   Monika Brock MD     Patient Active Problem List    Diagnosis Date Noted    Vitamin B12 deficiency 04/23/2019    Vitamin D deficiency 04/23/2019    Over weight 04/23/2019     Current Outpatient Medications   Medication Sig Dispense Refill    Alyacen 1/35, 28, 1-35 mg-mcg tab       loratadine (CLARITIN) 10 mg tablet Take 1 Tab by mouth daily. 30 Tab 0    magnesium oxide (MAG-OX) 400 mg tablet Take 1 Tab by mouth daily (after dinner). 30 Tab 1    levothyroxine (SYNTHROID) 50 mcg tablet TAKE 1 TABLET BY MOUTH EVERY DAY IN THE MORNING ON EMPTY STOMACH      ergocalciferol (ERGOCALCIFEROL) 50,000 unit capsule Take 1 Cap by mouth every seven (7) days. 4 Cap 5    docusate sodium (COLACE) 100 mg capsule Take 1 Cap by mouth two (2) times a day for 30 days. 60 Cap 0    hydrocortisone (ANUSOL-HC) 2.5 % rectal cream Insert  into rectum two (2) times a day. 30 g 0    ferrous sulfate 325 mg (65 mg iron) tablet Take 1 Tab by mouth Daily (before breakfast).  90 Tab 1     No Known Allergies  History reviewed. No pertinent past medical history. History reviewed. No pertinent surgical history. Family History   Problem Relation Age of Onset    Liver Disease Father         liver cirrhosis     Social History     Tobacco Use    Smoking status: Never Smoker    Smokeless tobacco: Never Used   Substance Use Topics    Alcohol use: Never     Frequency: Never       Review of Systems   Constitutional: Negative for chills, fever and malaise/fatigue. HENT: Positive for congestion. Negative for ear pain, sore throat and tinnitus. Eyes: Negative for blurred vision, double vision, pain and discharge. Respiratory: Positive for cough. Negative for shortness of breath and wheezing. Cardiovascular: Positive for palpitations. Negative for chest pain and leg swelling. Gastrointestinal: Negative for abdominal pain, blood in stool, constipation, diarrhea, nausea and vomiting. Genitourinary: Negative for dysuria, frequency, hematuria and urgency. Musculoskeletal: Negative for back pain, joint pain and myalgias. Skin: Negative for rash. Neurological: Negative for dizziness, tremors, seizures and headaches. Endo/Heme/Allergies: Negative for polydipsia. Does not bruise/bleed easily. Psychiatric/Behavioral: Negative for depression and substance abuse. The patient is nervous/anxious and has insomnia.         Objective:     Patient-Reported Vitals 12/15/2020   Patient-Reported LMP DECEMBER 1, 2020        [INSTRUCTIONS:  \"[x]\" Indicates a positive item  \"[]\" Indicates a negative item  -- DELETE ALL ITEMS NOT EXAMINED]    Constitutional: [x] Appears well-developed and well-nourished [x] No apparent distress      [] Abnormal -     Mental status: [x] Alert and awake  [x] Oriented to person/place/time [x] Able to follow commands    [] Abnormal -     Eyes:   EOM    [x]  Normal    [] Abnormal -   Sclera  [x]  Normal    [] Abnormal -          Discharge [x]  None visible   [] Abnormal -     HENT: [x] Normocephalic, atraumatic  [] Abnormal -   [x] Mouth/Throat: Mucous membranes are moist    External Ears [x] Normal  [] Abnormal -    Neck: [x] No visualized mass [] Abnormal -     Pulmonary/Chest: [x] Respiratory effort normal   [x] No visualized signs of difficulty breathing or respiratory distress        [] Abnormal -      Musculoskeletal:   [x] Normal gait with no signs of ataxia         [x] Normal range of motion of neck        [] Abnormal -     Neurological:        [x] No Facial Asymmetry (Cranial nerve 7 motor function) (limited exam due to video visit)          [x] No gaze palsy        [] Abnormal -          Skin:        [x] No significant exanthematous lesions or discoloration noted on facial skin         [] Abnormal -            Psychiatric:       [x] Normal Affect [] Abnormal -        [x] No Hallucinations    Other pertinent observable physical exam findings:-        We discussed the expected course, resolution and complications of the diagnosis(es) in detail. Medication risks, benefits, costs, interactions, and alternatives were discussed as indicated. I advised her to contact the office if her condition worsens, changes or fails to improve as anticipated. She expressed understanding with the diagnosis(es) and plan. Kamlesh Garcia, who was evaluated through a patient-initiated, synchronous (real-time) audio-video encounter, and/or her healthcare decision maker, is aware that it is a billable service, with coverage as determined by her insurance carrier. She provided verbal consent to proceed: Yes, and patient identification was verified. It was conducted pursuant to the emergency declaration under the [address] [place] [address] and the [employer] and [employer] General Act. A caregiver was present when appropriate. Ability to conduct physical exam was limited. I was in the office. The patient was at home.     This service was provided through telehealth, between patient Tano suggs from home and Ban Hawk MD from [facility]     I discussed with the patient the nature of our telemedicine visits, that:      I would evaluate the patient and recommend diagnostics and treatments based on my assessment   Our sessions are not being recorded and that personal health information is protected   Our team would provide follow up care in person if/when the patient needs it    Mary Rothman MD

## 2020-12-15 NOTE — PATIENT INSTRUCTIONS

## 2020-12-15 NOTE — PROGRESS NOTES
Chief Complaint   Patient presents with    Sleep Problem    Hair/Scalp Problem     hair loss      1. Have you been to the ER, urgent care clinic since your last visit? Hospitalized since your last visit? No    2. Have you seen or consulted any other health care providers outside of the [address] [place] [address] since your last visit? Include any pap smears or colon screening.  No

## 2021-11-16 ENCOUNTER — VIRTUAL VISIT (OUTPATIENT)
Dept: FAMILY MEDICINE CLINIC | Age: 31
End: 2021-11-16
Payer: COMMERCIAL

## 2021-11-16 DIAGNOSIS — R30.0 DYSURIA: Primary | ICD-10-CM

## 2021-11-16 PROCEDURE — 99213 OFFICE O/P EST LOW 20 MIN: CPT | Performed by: FAMILY MEDICINE

## 2021-11-16 NOTE — PROGRESS NOTES
Erik Butler is a 33 y.o. female who was seen by synchronous (real-time) audio-video technology on 11/16/2021 for Urinary Pain        Assessment & Plan:   Diagnoses and all orders for this visit:    1. Dysuria  -     URINALYSIS W/ REFLEX CULTURE; Future      We discussed possibility of contamination from vagina and presence of old clotted dried blood in urine versus urinary tract infection. She is planning for IVF, will check for culture and will treat with appropriate antibiotic. Subjective:   Urinary Tract Infection  Patient complains of frequency, urgency, hesitancy, black-colored deposits in urine. Onset was 1 week ago, unchanged since that time. . Patient denies hematuria, foul smelling urine, nausea, vomiting, diarrhea, constipation, suprapubic pressure, bilaterally flank pain, fever   patient admits  frequency, urgency, incomplete bladder emptyingPatient does not have a history of recurrent UTI. Patient does not have a history of pyelonephritis. Prior to Admission medications    Medication Sig Start Date End Date Taking? Authorizing Provider   Alyacen 1/35, 28, 1-35 mg-mcg tab  12/10/20   Provider, Historical   loratadine (CLARITIN) 10 mg tablet Take 1 Tab by mouth daily. 12/15/20   Roxane Chadwick MD   magnesium oxide (MAG-OX) 400 mg tablet Take 1 Tab by mouth daily (after dinner). 12/15/20   Roxane Chadwick MD   levothyroxine (SYNTHROID) 50 mcg tablet TAKE 1 TABLET BY MOUTH EVERY DAY IN THE MORNING ON EMPTY STOMACH 11/13/20   Provider, Historical   hydrocortisone (ANUSOL-HC) 2.5 % rectal cream Insert  into rectum two (2) times a day. 11/24/20   Roxane Chadwick MD   ferrous sulfate 325 mg (65 mg iron) tablet Take 1 Tab by mouth Daily (before breakfast). 11/6/19   Roxane Chadwick MD   ergocalciferol (ERGOCALCIFEROL) 50,000 unit capsule Take 1 Cap by mouth every seven (7) days.  4/26/19   Roxane Chadwick MD     Patient Active Problem List    Diagnosis Date Noted    Vitamin B12 deficiency 04/23/2019    Vitamin D deficiency 04/23/2019    Over weight 04/23/2019     Current Outpatient Medications   Medication Sig Dispense Refill    Alyacen 1/35, 28, 1-35 mg-mcg tab       loratadine (CLARITIN) 10 mg tablet Take 1 Tab by mouth daily. 30 Tab 0    magnesium oxide (MAG-OX) 400 mg tablet Take 1 Tab by mouth daily (after dinner). 30 Tab 1    levothyroxine (SYNTHROID) 50 mcg tablet TAKE 1 TABLET BY MOUTH EVERY DAY IN THE MORNING ON EMPTY STOMACH      hydrocortisone (ANUSOL-HC) 2.5 % rectal cream Insert  into rectum two (2) times a day. 30 g 0    ferrous sulfate 325 mg (65 mg iron) tablet Take 1 Tab by mouth Daily (before breakfast). 90 Tab 1    ergocalciferol (ERGOCALCIFEROL) 50,000 unit capsule Take 1 Cap by mouth every seven (7) days. 4 Cap 5     No Known Allergies  No past medical history on file. No past surgical history on file. Family History   Problem Relation Age of Onset    Liver Disease Father         liver cirrhosis     Social History     Tobacco Use    Smoking status: Never Smoker    Smokeless tobacco: Never Used   Substance Use Topics    Alcohol use: Never       Review of Systems   Constitutional: Negative for chills, fever and malaise/fatigue. HENT: Negative for congestion, ear pain, sore throat and tinnitus. Eyes: Negative for blurred vision, double vision, pain and discharge. Respiratory: Negative for cough, shortness of breath and wheezing. Cardiovascular: Negative for chest pain, palpitations and leg swelling. Gastrointestinal: Negative for abdominal pain, blood in stool, constipation, diarrhea, nausea and vomiting. Genitourinary: Positive for frequency and urgency. Negative for dysuria and hematuria. Black flecks in urine   Musculoskeletal: Negative for back pain, joint pain and myalgias. Skin: Negative for rash. Neurological: Negative for dizziness, tremors, seizures and headaches. Endo/Heme/Allergies: Negative for polydipsia.  Does not bruise/bleed easily. Psychiatric/Behavioral: Negative for depression and substance abuse. The patient is not nervous/anxious. Objective:     Patient-Reported Vitals 12/15/2020   Patient-Reported LMP DECEMBER 1, 2020        [INSTRUCTIONS:  \"[x]\" Indicates a positive item  \"[]\" Indicates a negative item  -- DELETE ALL ITEMS NOT EXAMINED]    Constitutional: [x] Appears well-developed and well-nourished [x] No apparent distress      [] Abnormal -     Mental status: [x] Alert and awake  [x] Oriented to person/place/time [x] Able to follow commands    [] Abnormal -     Eyes:   EOM    [x]  Normal    [] Abnormal -   Sclera  [x]  Normal    [] Abnormal -          Discharge [x]  None visible   [] Abnormal -     HENT: [x] Normocephalic, atraumatic  [] Abnormal -   [x] Mouth/Throat: Mucous membranes are moist    External Ears [x] Normal  [] Abnormal -    Neck: [x] No visualized mass [] Abnormal -     Pulmonary/Chest: [x] Respiratory effort normal   [x] No visualized signs of difficulty breathing or respiratory distress        [] Abnormal -      Musculoskeletal:   [x] Normal gait with no signs of ataxia         [x] Normal range of motion of neck        [] Abnormal -     Neurological:        [x] No Facial Asymmetry (Cranial nerve 7 motor function) (limited exam due to video visit)          [x] No gaze palsy        [] Abnormal -          Skin:        [x] No significant exanthematous lesions or discoloration noted on facial skin         [] Abnormal -            Psychiatric:       [x] Normal Affect [] Abnormal -        [x] No Hallucinations    Other pertinent observable physical exam findings:-        We discussed the expected course, resolution and complications of the diagnosis(es) in detail. Medication risks, benefits, costs, interactions, and alternatives were discussed as indicated. I advised her to contact the office if her condition worsens, changes or fails to improve as anticipated.  She expressed understanding with the diagnosis(es) and plan. Kermit Pulido, was evaluated through a synchronous (real-time) audio-video encounter. The patient (or guardian if applicable) is aware that this is a billable service. Verbal consent to proceed has been obtained within the past 12 months. The visit was conducted pursuant to the emergency declaration under the [address] [place] [address], [address] [place] [address] authority and the [employer] Act. Patient identification was verified, and a caregiver was present when appropriate. The patient was located in a state where the provider was credentialed to provide care.     This service was provided through telehealth, between patient Kermit Pulido participating from home and Francesca Moore MD from [facility]     I discussed with the patient the nature of our telemedicine visits, that:      I would evaluate the patient and recommend diagnostics and treatments based on my assessment   Our sessions are not being recorded and that personal health information is protected   Our team would provide follow up care in person if/when the patient needs it    Marie Jackson MD

## 2021-11-17 LAB
APPEARANCE UR: CLEAR
BACTERIA #/AREA URNS HPF: ABNORMAL /[HPF]
BILIRUB UR QL STRIP: NEGATIVE
CASTS URNS QL MICRO: ABNORMAL /LPF
COLOR UR: YELLOW
EPI CELLS #/AREA URNS HPF: ABNORMAL /HPF (ref 0–10)
GLUCOSE UR QL: NEGATIVE
HGB UR QL STRIP: NEGATIVE
KETONES UR QL STRIP: NEGATIVE
LEUKOCYTE ESTERASE UR QL STRIP: NEGATIVE
MICRO URNS: NORMAL
MICRO URNS: NORMAL
NITRITE UR QL STRIP: NEGATIVE
PH UR STRIP: 6.5 [PH] (ref 5–7.5)
PROT UR QL STRIP: NEGATIVE
RBC #/AREA URNS HPF: ABNORMAL /HPF (ref 0–2)
SP GR UR: 1.02 (ref 1–1.03)
URINALYSIS REFLEX, 377202: NORMAL
UROBILINOGEN UR STRIP-MCNC: 0.2 MG/DL (ref 0.2–1)
WBC #/AREA URNS HPF: ABNORMAL /HPF (ref 0–5)

## 2021-11-17 NOTE — PROGRESS NOTES
Yasmin Leija,  Your urine is negative for any infection, bacteria or yeast.  It is positive for trace amount of red blood cell that is very common and likely from vaginal contamination. Nothing to worry, just stay hydrated and no concern if you are planning for IVF. Thanks

## 2022-03-18 PROBLEM — E53.8 VITAMIN B12 DEFICIENCY: Status: ACTIVE | Noted: 2019-04-23

## 2022-03-19 PROBLEM — E66.3 OVER WEIGHT: Status: ACTIVE | Noted: 2019-04-23

## 2022-03-19 PROBLEM — E55.9 VITAMIN D DEFICIENCY: Status: ACTIVE | Noted: 2019-04-23

## 2023-05-24 RX ORDER — LEVOTHYROXINE SODIUM 0.05 MG/1
TABLET ORAL
COMMUNITY
Start: 2020-11-13

## 2023-05-24 RX ORDER — FERROUS SULFATE 325(65) MG
325 TABLET ORAL
COMMUNITY
Start: 2019-11-06

## 2023-05-24 RX ORDER — ERGOCALCIFEROL 1.25 MG/1
50000 CAPSULE ORAL
COMMUNITY
Start: 2019-04-26

## 2023-05-24 RX ORDER — MAGNESIUM OXIDE 400 MG/1
400 TABLET ORAL
COMMUNITY
Start: 2020-12-15

## 2023-05-24 RX ORDER — LORATADINE 10 MG/1
10 TABLET ORAL DAILY
COMMUNITY
Start: 2020-12-15